# Patient Record
Sex: MALE | Race: WHITE | NOT HISPANIC OR LATINO | Employment: FULL TIME | URBAN - METROPOLITAN AREA
[De-identification: names, ages, dates, MRNs, and addresses within clinical notes are randomized per-mention and may not be internally consistent; named-entity substitution may affect disease eponyms.]

---

## 2017-02-27 ENCOUNTER — GENERIC CONVERSION - ENCOUNTER (OUTPATIENT)
Dept: OTHER | Facility: OTHER | Age: 37
End: 2017-02-27

## 2017-09-20 ENCOUNTER — OFFICE VISIT (OUTPATIENT)
Dept: LAB | Age: 37
End: 2017-09-20
Payer: COMMERCIAL

## 2017-09-20 ENCOUNTER — APPOINTMENT (OUTPATIENT)
Dept: RADIOLOGY | Age: 37
End: 2017-09-20
Payer: COMMERCIAL

## 2017-09-20 ENCOUNTER — TRANSCRIBE ORDERS (OUTPATIENT)
Dept: ADMINISTRATIVE | Age: 37
End: 2017-09-20

## 2017-09-20 ENCOUNTER — APPOINTMENT (OUTPATIENT)
Dept: LAB | Age: 37
End: 2017-09-20
Payer: COMMERCIAL

## 2017-09-20 DIAGNOSIS — R10.9 ABDOMINAL PAIN, UNSPECIFIED SITE: ICD-10-CM

## 2017-09-20 DIAGNOSIS — R10.9 ABDOMINAL PAIN, UNSPECIFIED SITE: Primary | ICD-10-CM

## 2017-09-20 LAB
ALBUMIN SERPL BCP-MCNC: 3.8 G/DL (ref 3.5–5)
ALP SERPL-CCNC: 61 U/L (ref 46–116)
ALT SERPL W P-5'-P-CCNC: 31 U/L (ref 12–78)
ANION GAP SERPL CALCULATED.3IONS-SCNC: 8 MMOL/L (ref 4–13)
AST SERPL W P-5'-P-CCNC: 26 U/L (ref 5–45)
ATRIAL RATE: 73 BPM
BASOPHILS # BLD AUTO: 0.02 THOUSANDS/ΜL (ref 0–0.1)
BASOPHILS NFR BLD AUTO: 0 % (ref 0–1)
BILIRUB SERPL-MCNC: 0.62 MG/DL (ref 0.2–1)
BUN SERPL-MCNC: 16 MG/DL (ref 5–25)
CALCIUM SERPL-MCNC: 9.1 MG/DL (ref 8.3–10.1)
CHLORIDE SERPL-SCNC: 100 MMOL/L (ref 100–108)
CO2 SERPL-SCNC: 29 MMOL/L (ref 21–32)
CREAT SERPL-MCNC: 1.1 MG/DL (ref 0.6–1.3)
EOSINOPHIL # BLD AUTO: 0.22 THOUSAND/ΜL (ref 0–0.61)
EOSINOPHIL NFR BLD AUTO: 4 % (ref 0–6)
ERYTHROCYTE [DISTWIDTH] IN BLOOD BY AUTOMATED COUNT: 13.6 % (ref 11.6–15.1)
GFR SERPL CREATININE-BSD FRML MDRD: 87 ML/MIN/1.73SQ M
GLUCOSE SERPL-MCNC: 101 MG/DL (ref 65–140)
HCT VFR BLD AUTO: 44.5 % (ref 36.5–49.3)
HGB BLD-MCNC: 14.6 G/DL (ref 12–17)
LYMPHOCYTES # BLD AUTO: 2.07 THOUSANDS/ΜL (ref 0.6–4.47)
LYMPHOCYTES NFR BLD AUTO: 34 % (ref 14–44)
MCH RBC QN AUTO: 29.1 PG (ref 26.8–34.3)
MCHC RBC AUTO-ENTMCNC: 32.8 G/DL (ref 31.4–37.4)
MCV RBC AUTO: 89 FL (ref 82–98)
MONOCYTES # BLD AUTO: 0.78 THOUSAND/ΜL (ref 0.17–1.22)
MONOCYTES NFR BLD AUTO: 13 % (ref 4–12)
NEUTROPHILS # BLD AUTO: 2.98 THOUSANDS/ΜL (ref 1.85–7.62)
NEUTS SEG NFR BLD AUTO: 49 % (ref 43–75)
NRBC BLD AUTO-RTO: 0 /100 WBCS
P AXIS: 53 DEGREES
PLATELET # BLD AUTO: 157 THOUSANDS/UL (ref 149–390)
PMV BLD AUTO: 11.5 FL (ref 8.9–12.7)
POTASSIUM SERPL-SCNC: 3.7 MMOL/L (ref 3.5–5.3)
PR INTERVAL: 156 MS
PROT SERPL-MCNC: 7.8 G/DL (ref 6.4–8.2)
QRS AXIS: 88 DEGREES
QRSD INTERVAL: 96 MS
QT INTERVAL: 388 MS
QTC INTERVAL: 427 MS
RBC # BLD AUTO: 5.01 MILLION/UL (ref 3.88–5.62)
SODIUM SERPL-SCNC: 137 MMOL/L (ref 136–145)
T WAVE AXIS: 48 DEGREES
VENTRICULAR RATE: 73 BPM
WBC # BLD AUTO: 6.09 THOUSAND/UL (ref 4.31–10.16)

## 2017-09-20 PROCEDURE — 71020 HB CHEST X-RAY 2VW FRONTAL&LATL: CPT

## 2017-09-20 PROCEDURE — 80053 COMPREHEN METABOLIC PANEL: CPT

## 2017-09-20 PROCEDURE — 36415 COLL VENOUS BLD VENIPUNCTURE: CPT

## 2017-09-20 PROCEDURE — 85025 COMPLETE CBC W/AUTO DIFF WBC: CPT

## 2017-09-20 PROCEDURE — 93005 ELECTROCARDIOGRAM TRACING: CPT

## 2020-09-02 PROCEDURE — 88305 TISSUE EXAM BY PATHOLOGIST: CPT | Performed by: PATHOLOGY

## 2020-09-03 ENCOUNTER — LAB REQUISITION (OUTPATIENT)
Dept: LAB | Facility: HOSPITAL | Age: 40
End: 2020-09-03
Payer: COMMERCIAL

## 2020-09-03 DIAGNOSIS — D48.5 NEOPLASM OF UNCERTAIN BEHAVIOR OF SKIN: ICD-10-CM

## 2021-10-11 ENCOUNTER — HOSPITAL ENCOUNTER (EMERGENCY)
Facility: HOSPITAL | Age: 41
Discharge: HOME | End: 2021-10-12
Attending: EMERGENCY MEDICINE
Payer: COMMERCIAL

## 2021-10-11 VITALS
OXYGEN SATURATION: 97 % | SYSTOLIC BLOOD PRESSURE: 117 MMHG | BODY MASS INDEX: 27.32 KG/M2 | HEART RATE: 78 BPM | RESPIRATION RATE: 18 BRPM | WEIGHT: 185 LBS | TEMPERATURE: 98.7 F | DIASTOLIC BLOOD PRESSURE: 80 MMHG

## 2021-10-11 DIAGNOSIS — S01.112A LEFT EYELID LACERATION, INITIAL ENCOUNTER: Primary | ICD-10-CM

## 2021-10-11 PROCEDURE — 99282 EMERGENCY DEPT VISIT SF MDM: CPT

## 2021-10-11 PROCEDURE — 99282 EMERGENCY DEPT VISIT SF MDM: CPT | Performed by: EMERGENCY MEDICINE

## 2021-10-11 PROCEDURE — 12011 RPR F/E/E/N/L/M 2.5 CM/<: CPT | Performed by: EMERGENCY MEDICINE

## 2021-10-11 RX ORDER — LIDOCAINE HYDROCHLORIDE 10 MG/ML
5 INJECTION, SOLUTION EPIDURAL; INFILTRATION; INTRACAUDAL; PERINEURAL ONCE
Status: COMPLETED | OUTPATIENT
Start: 2021-10-12 | End: 2021-10-11

## 2021-10-11 RX ADMIN — LIDOCAINE HYDROCHLORIDE 5 ML: 10 INJECTION, SOLUTION EPIDURAL; INFILTRATION; INTRACAUDAL; PERINEURAL at 23:51

## 2023-03-10 ENCOUNTER — EVALUATION (OUTPATIENT)
Dept: PHYSICAL THERAPY | Facility: OTHER | Age: 43
End: 2023-03-10

## 2023-03-10 DIAGNOSIS — Z98.890 STATUS POST MEDIAL MENISCECTOMY OF LEFT KNEE: Primary | ICD-10-CM

## 2023-03-10 DIAGNOSIS — M25.561 ACUTE PAIN OF RIGHT KNEE: ICD-10-CM

## 2023-03-10 NOTE — PROGRESS NOTES
PT Evaluation     Today's date: 3/10/2023  Patient name: Paige Diallo  : 1980  MRN: [id number]  Referring provider: Kalen Suggs PA-C  Dx:   Encounter Diagnosis     ICD-10-CM    1  Acute pain of right knee  M25 561       2  Status post medial meniscectomy of left knee  Z98 890                      Assessment  Assessment details: Paige Diallo is a 38y o  year-old M who presents with s/p L PMM and R medial knee pain  DOS was 23  Pain developed in L knee approximately four years prior  Pt underwent L plica surgery in 2021  Pt underwent previous physical therapy tx for the L knee s/p in  with a positive experience, however still felt discomfort and pain in the L knee in which follow up with ortho declared for PMM  A couple days prior to scheduled PMM, pt went hiking and twisted the R knee in which doctor remarked potential R sprained MCL  Pt is utilizing elastic knee sleeve on R knee  Pt regards L knee feeling "astronomically better" since PMM  Pt is concerned about potential baker's cyst presence and tightness in back of L knee  Pt has no previous hx of LBP or B knee issues besides what has been described  No issues with sleeping  Pt remarks surgeon telling him he is WBAT with activity restrictions  No further referral is necessary at this time  Pt is fairly active, regularly participating in [employer], hiking, snowboarding and employed as a local   Pt describes how eager he is to return to PLOF and return to work  Pt may be experiencing L popliteus tightness, B knee pain, and hip weakness based on clinical presentation of L PMM  Pt would benefit from skilled physical therapy services to address current deficits, improve quality of life, and restore PLOF      Primary Impairments:  1) Decreased L knee extension  2) B LE neural tension  3) B hip weakness  4) B knee pain  5) L popliteal tightness    Etiologic factors include previous L knee plica surgery, overuse of activity with chronic loading of B knees, reliance on R knee causing R knee pain due to L knee impairments, demanding physical job    Positive prognostic indicators include age, attitude, physically active  Negative prognostic factors include eagerness to overuse or load too much    Function Based Goals:  Patient will be independent with HEP upon discharge  Patient will be able to independently manage symptoms upon discharge  Patient will be able to participate in recreational activities with minimal to no symptoms upon discharge  Patient will be able to return to work with minimal to no symptoms upon discharge  Impairment Based Goals:  Pt will improve B hip strength by 1/2 MMT grade in 4 weeks  Pt will improve L knee ROM to 0 degrees extension in 4 weeks  Pt will improve B LE neural tension by (-) slump test in 4 weeks  Pt will improve soft tissue mobility of popliteus through IASTM and PT palpation in 3 weeks  Pt will improve hamstring flexibility by SLR measurements/PT observation in 4 weeks    Impairments: abnormal or restricted ROM, impaired physical strength and pain with function    Symptom irritability: lowUnderstanding of Dx/Px/POC: good   Prognosis: good    Plan  Plan details: 2x/week for 12 weeks with HEP  Referral necessary: No  Planned modality interventions: low level laser therapy, manual electrical stimulation, TENS, cryotherapy and electrical stimulation/Russian stimulation  Other planned modality interventions: EPAT  Planned therapy interventions: joint mobilization, manual therapy, massage, balance, ADL training, motor coordination training, neuromuscular re-education, body mechanics training, patient education, postural training, coordination, stretching, strengthening, therapeutic activities, therapeutic exercise, flexibility, functional ROM exercises, gait training and home exercise program  Frequency: 2x week  Duration in weeks: 12  Treatment plan discussed with: patient        Subjective Evaluation    History of Present Illness  Date of onset: 3/10/2019  Date of surgery: 2023  Mechanism of injury: surgery          Recurrent probem    Quality of life: good    Pain  Current pain ratin  At best pain ratin  At worst pain ratin  Location: L knee, R medial side  (deep squat, pinch )  Quality: dull ache (popping but feels good)  Relieving factors: ice and relaxation  Exacerbated by: duck wall, deep squat,   Progression: improved    Social Support  Lives with: parents (my parents )    Employment status: not working ( )  Exercise comments: fairly active       Diagnostic Tests  MRI studies: abnormal (Only received for L knee; tear of medial meniscus )  Treatments  Previous treatment: physical therapy [facility] )  Patient Goals  Patient goals for therapy: decreased edema, increased strength, decreased pain, independence with ADLs/IADLs, improved balance, increased motion, return to work and return to sport/leisure activities  Patient goal: Pt states wanting to return back to work, [employer] and recreational activities  Objective     Static Posture     Comments  Lower Extremity IE       LQS:  L4 reflex: L     2+  R 2+  S1 reflex: L   2+    R 2+  Dermatomes: L   2+    R 2+  Myotomes: L    2+   R 2+    Slump test: L   +    R +      Palpation: popliteus tightness in B     Squat: good form with B ankle pronation     Knee MMT:  Flexion: L  4+ /5    R    4+/5  Extension: L  4+ /5    R    4+/5      Foot joint mobility:  Subtalar mobility: L   Mod bone    R mod bone  Midfoot mobility: L    good   R good  Forefoot mobility: L    good   R good  Talocrural mobility: L   Mod bone    R mod bone       Knee ROM:  Flexion: L    122 p! R -2 p! Extension: L    122 p!    R 0  Patellar mobility: L     good  R good     Quad contraction: L  good     R good     Hip MMT:  Flexion: L   4+ /5     R   4+ /5  Abduction: L    4+/5     R    4/5  Extension glute: L   4+ /5     R 4+/5  Extension hamstring: L   4 /5, p!      R   4+ /5    Ely's test: L +       R +    ASLR: L 100 R 95  PSLR: L 90 R 90    Knee clinical tests:  Anterior drawer: L   Not tested    R -  Posterior drawer: L   Not tested     R -  Valgus: L       R - (in extension)  Varus: L       R - (in extension)  Tiffany's: L       R -   Thessaly's: L      R + in flexion, - in extension               Precautions: WBAT    Treatment performed and note completed by Bere Méndez, SPT      Manuals 3/10            Prone knee ext             L knee distraction             Supine knee extension             Popliteus STM             Neuro Re-Ed             Sciatic nerve glides  2x8 each            Hamstring stretch 3x30" each            TKE             Soleus squats             Leg press             clamshells             Prone hamstrings ext 2x10x5"            HR/TR                          Prone glute ext 2x10x5"            Ther Ex             bike             Mini squats             LAQ                                                                              Ther Activity                                       Gait Training                                       Modalities

## 2023-03-10 NOTE — LETTER
March 10, 2023    Ronaldo Quezada PA-C  [address]    Patient: Manuel Arango   YOB: 1980   Date of Visit: 3/10/2023     Encounter Diagnosis     ICD-10-CM    1  Status post medial meniscectomy of left knee  Z98 890       2  Acute pain of right knee  M25 561           Dear Dr Az Portillo: Thank you for your recent referral of Manuel Arango  Please review the attached evaluation summary from Flash's recent visit  Please verify that you agree with the plan of care by signing the attached order  If you have any questions or concerns, please do not hesitate to call  I sincerely appreciate the opportunity to share in the care of one of your patients and hope to have another opportunity to work with you in the near future  Sincerely,    Vidhya Campbell, PT      Referring Provider:      I certify that I have read the below Plan of Care and certify the need for these services furnished under this plan of treatment while under my care  Ronaldo Quezada PA-C  [address]          PT Evaluation     Today's date: 3/10/2023  Patient name: Manuel Arango  : 1980  MRN: [id number]  Referring provider: José Luis John PA-C  Dx:   Encounter Diagnosis     ICD-10-CM    1  Acute pain of right knee  M25 561       2  Status post medial meniscectomy of left knee  Z98 890                      Assessment  Assessment details: Manuel Arango is a 38y o  year-old M who presents with s/p L PMM and R medial knee pain  DOS was 23  Pain developed in L knee approximately four years prior  Pt underwent L plica surgery in 2021  Pt underwent previous physical therapy tx for the L knee s/p in  with a positive experience, however still felt discomfort and pain in the L knee in which follow up with ortho declared for PMM   A couple days prior to scheduled PMM, pt went hiking and twisted the R knee in which doctor remarked potential R sprained MCL  Pt is utilizing elastic knee sleeve on R knee  Pt regards L knee feeling "astronomically better" since PMM  Pt is concerned about potential baker's cyst presence and tightness in back of L knee  Pt has no previous hx of LBP or B knee issues besides what has been described  No issues with sleeping  Pt remarks surgeon telling him he is WBAT with activity restrictions  No further referral is necessary at this time  Pt is fairly active, regularly participating in [employer], [employer], [employer] and employed as a local   Pt describes how eager he is to return to PLOF and return to work  Pt may be experiencing L popliteus tightness, B knee pain, and hip weakness based on clinical presentation of L PMM  Pt would benefit from skilled physical therapy services to address current deficits, improve quality of life, and restore PLOF  Primary Impairments:  1) Decreased L knee extension  2) B LE neural tension  3) B hip weakness  4) B knee pain  5) L popliteal tightness    Etiologic factors include previous L knee plica surgery, overuse of activity with chronic loading of B knees, reliance on R knee causing R knee pain due to L knee impairments, demanding physical job    Positive prognostic indicators include age, attitude, physically active  Negative prognostic factors include eagerness to overuse or load too much    Function Based Goals:  Patient will be independent with HEP upon discharge  Patient will be able to independently manage symptoms upon discharge  Patient will be able to participate in recreational activities with minimal to no symptoms upon discharge  Patient will be able to return to work with minimal to no symptoms upon discharge  Impairment Based Goals:  Pt will improve B hip strength by 1/2 MMT grade in 4 weeks  Pt will improve L knee ROM to 0 degrees extension in 4 weeks    Pt will improve B LE neural tension by (-) slump test in 4 weeks  Pt will improve soft tissue mobility of popliteus through IASTM and PT palpation in 3 weeks  Pt will improve hamstring flexibility by SLR measurements/PT observation in 4 weeks  Impairments: abnormal or restricted ROM, impaired physical strength and pain with function    Symptom irritability: lowUnderstanding of Dx/Px/POC: good   Prognosis: good    Plan  Plan details: 2x/week for 12 weeks with HEP  Referral necessary: No  Planned modality interventions: low level laser therapy, manual electrical stimulation, TENS, cryotherapy and electrical stimulation/Russian stimulation  Other planned modality interventions: EPAT  Planned therapy interventions: joint mobilization, manual therapy, massage, balance, ADL training, motor coordination training, neuromuscular re-education, body mechanics training, patient education, postural training, coordination, stretching, strengthening, therapeutic activities, therapeutic exercise, flexibility, functional ROM exercises, gait training and home exercise program  Frequency: 2x week  Duration in weeks: 12  Treatment plan discussed with: patient        Subjective Evaluation    History of Present Illness  Date of onset: 3/10/2019  Date of surgery: 2023  Mechanism of injury: surgery          Recurrent probem    Quality of life: good    Pain  Current pain ratin  At best pain ratin  At worst pain ratin  Location: L knee, R medial side  (deep squat, pinch )  Quality: dull ache (popping but feels good)  Relieving factors: ice and relaxation  Exacerbated by: duck wall, deep squat,   Progression: improved    Social Support  Lives with: parents (my parents )    Employment status: not working ( )  Exercise comments: fairly active       Diagnostic Tests  MRI studies: abnormal (Only received for L knee; tear of medial meniscus )  Treatments  Previous treatment: physical therapy [facility] [place] [facility] )  Patient Goals  Patient goals for therapy: decreased edema, increased strength, decreased pain, independence with ADLs/IADLs, improved balance, increased motion, return to work and return to sport/leisure activities  Patient goal: Pt states wanting to return back to work, jiu jitsu and recreational activities  Objective     Static Posture     Comments  Lower Extremity IE       LQS:  L4 reflex: L     2+  R 2+  S1 reflex: L   2+    R 2+  Dermatomes: L   2+    R 2+  Myotomes: L    2+   R 2+    Slump test: L   +    R +      Palpation: popliteus tightness in B     Squat: good form with B ankle pronation     Knee MMT:  Flexion: L  4+ /5    R    4+/5  Extension: L  4+ /5    R    4+/5      Foot joint mobility:  Subtalar mobility: L   Mod bone    R mod bone  Midfoot mobility: L    good   R good  Forefoot mobility: L    good   R good  Talocrural mobility: L   Mod bone    R mod bone       Knee ROM:  Flexion: L    122 p! R -2 p! Extension: L    122 p! R 0  Patellar mobility: L     good  R good     Quad contraction: L  good     R good     Hip MMT:  Flexion: L   4+ /5     R   4+ /5  Abduction: L    4+/5     R    4/5  Extension glute: L   4+ /5     R    4+/5  Extension hamstring: L   4 /5, p!      R   4+ /5    Ely's test: L +       R +    ASLR: L 100 R 95  PSLR: L 90 R 90    Knee clinical tests:  Anterior drawer: L   Not tested    R -  Posterior drawer: L   Not tested     R -  Valgus: L       R - (in extension)  Varus: L       R - (in extension)  Tiffany's: L       R -   Thessaly's: L      R + in flexion, - in extension              Precautions: WBAT    Treatment performed and note completed by [facility]      Manuals 3/10            Prone knee ext             L knee distraction             Supine knee extension             Popliteus STM             Neuro Re-Ed             Sciatic nerve glides  2x8 each            Hamstring stretch 3x30" each            TKE             Soleus squats             Leg press             clamshells             Prone hamstrings ext 2x10x5"            HR/TR                          Prone glute ext 2x10x5"            Ther Ex             bike             Mini squats             LAQ                                                                              Ther Activity                                       Gait Training                                       Modalities

## 2023-03-13 ENCOUNTER — OFFICE VISIT (OUTPATIENT)
Dept: PHYSICAL THERAPY | Facility: OTHER | Age: 43
End: 2023-03-13

## 2023-03-13 DIAGNOSIS — M25.561 ACUTE PAIN OF RIGHT KNEE: Primary | ICD-10-CM

## 2023-03-13 DIAGNOSIS — Z98.890 STATUS POST MEDIAL MENISCECTOMY OF LEFT KNEE: ICD-10-CM

## 2023-03-13 NOTE — PROGRESS NOTES
Daily Note     Today's date: 3/13/2023  Patient name: Anurag Levy  : 1980  MRN: [id number]  Referring provider: Liyah Ureña PA-C  Dx:   Encounter Diagnosis     ICD-10-CM    1  Acute pain of right knee  M25 561       2  Status post medial meniscectomy of left knee  Z98 890               Total Treatment time: ; one on one     Subjective: Pt remarks feeling good and nervous/hesitant about R knee  "Each day gets better "      Objective: See treatment diary below      Assessment: Assessed Hip ER/IR MMT (B )  Introduced LAQ, TKE, Leg press,  and [place] Hamstring Curls in which pt tolerated appropriately with moderate fatigue  Pt will continue to participate in B LE strengthening in order to return to PLOF  No pain experienced throughout session  Plan: Continue per plan of care        Precautions: WBAT    Treatment performed and note completed by Tim Hinkle, SPT      Manuals 3/10 3/13           Prone knee ext             L knee distraction  AC           Supine knee extension  AC           Popliteus STM  AC L knee           Neuro Re-Ed             Sciatic nerve glides  2x8 each 1x10 each           Hamstring stretch 3x30" each            TKE  15 5 Phelan   2x10x5" B           Soleus squats             Leg press  2x10 110# DL           clamshells             Prone hamstrings ext 2x10x5"            HR/TR             Filipino hamstrings  2x10  pball             Kneeling Weight shift+ glute engagement              Eccentric Ankle DF             Prone glute ext 2x10x5"            Ther Ex             bike  10'           Mini squats             LAQ  2x10x5" 4#                                                                            Ther Activity                                       Gait Training                                       Modalities

## 2023-03-17 ENCOUNTER — OFFICE VISIT (OUTPATIENT)
Dept: PHYSICAL THERAPY | Facility: OTHER | Age: 43
End: 2023-03-17

## 2023-03-17 DIAGNOSIS — M25.561 ACUTE PAIN OF RIGHT KNEE: Primary | ICD-10-CM

## 2023-03-17 DIAGNOSIS — Z98.890 STATUS POST MEDIAL MENISCECTOMY OF LEFT KNEE: ICD-10-CM

## 2023-03-17 NOTE — PROGRESS NOTES
Daily Note     Today's date: 3/17/2023  Patient name: Keaton Query  : 1980  MRN: [id number]  Referring provider: Alvarez Ferguson PA-C  Dx:   Encounter Diagnosis     ICD-10-CM    1  Acute pain of right knee  M25 561       2  Status post medial meniscectomy of left knee  Z98 890           Start Time: 1115Total treatment time: 0977-7748; one on one time 2880-9505  Stop Time: 1200  Total time in clinic (min): 45 minutes    Subjective: Pt describes no popliteus tightness in the L knee and overdoing it with HEP by adding "holds"  Pt describes no change in pain with R knee  Objective: See treatment diary below      Assessment: Pt educated upon BFR, HEP modification, and imaging  Pt tolerated B LE strengthening/mobility appropriately with moderate fatigue  BFR nv  Introduced step downs and hip mobility with blanca  Pt will continue to participate in skilled PT to address B LE weakness and to improve return to recreational activities  Plan: Continue per plan of care        Precautions: WBAT    Treatment performed and note completed by Varsha Nielsen, SPT      Manuals 3/10 3/13 3/17          Prone knee ext             L knee distraction  AC           Supine knee extension  AC           Popliteus STM  AC L knee AC L knee          Neuro Re-Ed             Sciatic nerve glides  2x8 each 1x10 each 1x10 each          Hamstring stretch 3x30" each            TKE  15 5 Phelan   2x10x5" B           Soleus squats   10"x10          Leg press  2x10 110# DL           clamshells             Prone hamstrings ext 2x10x5"            TR   10"x20  scrunched toes             Bangladeshi hamstrings  2x10  pball             Sassy hips   nv          Step down    6" 1x10 ea lateral/front          Prone quad stretch    1x30" ea with strap          Prone glute ext 2x10x5"            Ther Ex             bike  10' 10'           Mini squats             LAQ  2x10x5" 4# Ther Activity             Pt education   BFR  HEP overuse                         Gait Training                                       Modalities

## 2023-03-20 ENCOUNTER — OFFICE VISIT (OUTPATIENT)
Dept: PHYSICAL THERAPY | Facility: OTHER | Age: 43
End: 2023-03-20

## 2023-03-20 DIAGNOSIS — Z98.890 STATUS POST MEDIAL MENISCECTOMY OF LEFT KNEE: ICD-10-CM

## 2023-03-20 DIAGNOSIS — M25.561 ACUTE PAIN OF RIGHT KNEE: Primary | ICD-10-CM

## 2023-03-20 NOTE — PROGRESS NOTES
Daily Note     Today's date: 3/20/2023  Patient name: Nathalie Coleman  : 1980  MRN: [id number]  Referring provider: Nile Mullen PA-C  Dx:   Encounter Diagnosis     ICD-10-CM    1  Acute pain of right knee  M25 561       2  Status post medial meniscectomy of left knee  Z98 890                      Subjective: Pt remarks feeling so much better with resting and not overdoing exercise over the weekend  Objective: See treatment diary below       Assessment: Introduced BFR this session with SLR, prone hamstring extension and SL abduction  Pt tolerated treatment with moderate fatigue  Pt educated on post treatment protein consumption  Will incorporate BFR in leg press and SL RDLs nv  Pt will continue to participate in skilled therapy in order to increase B LE strength in order to return to recreational activities  Plan: Continue per plan of care  Precautions: WBAT    Treatment performed and note completed by Lisbeth Murphy, SPT      Manuals 3/10 3/13 3/17 3/20         Prone knee ext             L knee distraction  AC           Supine knee extension  AC           Popliteus STM  AC L knee AC L knee          Neuro Re-Ed             Sciatic nerve glides  2x8 each 1x10 each 1x10 each          Hamstring stretch 3x30" each            SLR    BFR L knee  30x, 3x15         SL Abd       BFR  L knee  30x, 3x15         TKE  15 5 Phelan   2x10x5" B           Soleus squats   10"x10          Leg press  2x10 110# DL           clamshells             Prone hamstrings ext 2x10x5"   BFR  L knee  30x, 3x15         TR   10"x20  scrunched toes             [place] hamstrings  2x10  pball             Sassy hips   nv          Step down    6" 1x10 ea lateral/front          Prone quad stretch    1x30" ea with strap          Prone glute ext 2x10x5"            Ther Ex             bike  10' 10'           Mini squats             LAQ  2x10x5" 4#                                                                            Ther Activity Pt education   BFR  HEP overuse                         Gait Training                                       Modalities

## 2023-03-24 ENCOUNTER — OFFICE VISIT (OUTPATIENT)
Dept: PHYSICAL THERAPY | Facility: OTHER | Age: 43
End: 2023-03-24

## 2023-03-24 DIAGNOSIS — Z98.890 STATUS POST MEDIAL MENISCECTOMY OF LEFT KNEE: ICD-10-CM

## 2023-03-24 DIAGNOSIS — M25.561 ACUTE PAIN OF RIGHT KNEE: Primary | ICD-10-CM

## 2023-03-24 NOTE — PROGRESS NOTES
Daily Note     Today's date: 3/24/2023  Patient name: Michelet Suggs  : 1980  MRN: [id number]  Referring provider: Tracey Camp PA-C  Dx:   Encounter Diagnosis     ICD-10-CM    1  Acute pain of right knee  M25 561       2  Status post medial meniscectomy of left knee  Z98 890                      Subjective: "I felt some soreness last time but honestly really good  Probably overdoing it a bit "      Objective: See treatment diary below      Assessment: Educated pt on the importance of avoiding overuse and rest for proper healing  Introduced sassy hip plus step down  and SL RDL in which pt tolerated with moderate fatigue requiring multiple VC on proper posture  Nv FOTO, just sassy hip and more education on dowel cueing  Plan: Continue per plan of care  Precautions: WBAT    Treatment performed and note completed by Vivian Colon, SPT      Manuals 3/10 3/13 3/17 3/20 3/24        Prone knee ext             L knee distraction  AC           Supine knee extension  AC           Popliteus STM  AC L knee AC L knee          Neuro Re-Ed             Sciatic nerve glides  2x8 each 1x10 each 1x10 each          Hamstring stretch 3x30" each            SLR    BFR L knee  30x, 3x15 BFR L knee  30x, 3x15        SL Abd       BFR  L knee  30x, 3x15         TKE  15 5 Phelan   2x10x5" B   15 5 Phelan   2x10x5" B        Soleus squats   10"x10          Leg press  2x10 110# DL           clamshells     nv        Prone hamstrings ext 2x10x5"   BFR  L knee  30x, 3x15 BFR  L knee  30x, 3x15        SL Balance     3x30" airex B        TR   10"x20  scrunched toes             [place]uv[place] hamstrings  2x10  pball             Sassy hips+ step down   nv  1x10 B  6"        SL RDL     1x8 B with dowel         Step down    6" 1x10 ea lateral/front          Prone quad stretch    1x30" ea with strap          Prone glute ext 2x10x5"            Ther Ex             bike  10' 10'   10'        Mini squats             LAQ  2x10x5" 4# Ther Activity             Pt education   BFR  HEP overuse                         Gait Training                                       Modalities

## 2023-03-28 ENCOUNTER — OFFICE VISIT (OUTPATIENT)
Dept: PHYSICAL THERAPY | Facility: OTHER | Age: 43
End: 2023-03-28

## 2023-03-28 DIAGNOSIS — Z98.890 STATUS POST MEDIAL MENISCECTOMY OF LEFT KNEE: Primary | ICD-10-CM

## 2023-03-28 DIAGNOSIS — M25.561 ACUTE PAIN OF RIGHT KNEE: ICD-10-CM

## 2023-03-31 ENCOUNTER — OFFICE VISIT (OUTPATIENT)
Dept: PHYSICAL THERAPY | Facility: OTHER | Age: 43
End: 2023-03-31

## 2023-03-31 DIAGNOSIS — M25.561 ACUTE PAIN OF RIGHT KNEE: ICD-10-CM

## 2023-03-31 DIAGNOSIS — Z98.890 STATUS POST MEDIAL MENISCECTOMY OF LEFT KNEE: Primary | ICD-10-CM

## 2023-03-31 NOTE — PROGRESS NOTES
"Daily Note     Today's date: 3/31/2023  Patient name: Astrid Alejandro  : 1980  MRN: [id number]  Referring provider: Lisa Kim PA-C  Dx:   Encounter Diagnosis     ICD-10-CM    1  Status post medial meniscectomy of left knee  Z98 890       2  Acute pain of right knee  M25 561            Total treatment time: ; one on one           Subjective: Pt remarks no soreness and relief in R knee  Pt describes only feeling pain in deep \"L knee flexion\"       Objective: See treatment diary below      Assessment: Measured PROM L knee flexion at 136 degrees  Introduced lunges, piriformis stretch and hip mobility to improve functional jiu jitsu tolerance  Pt will continue to participate in skilled PT in order to improve B hip strengthening in order to return to recreational jiu jitsu  Nv will reassess B hip strength and assess child's pose in order to promote increased B knee flexion that mimics sport  Plan: Continue per plan of care  Precautions: WBAT    Treatment performed and note completed by Katina Acuña, CARRILLO      Manuals 3/10 3/13 3/17 3/20 3/24 3/27 3/31      Prone knee ext             L knee distraction  AC           Supine knee extension  AC           L knee ROM             Piriformis stretch        B AC      Popliteus STM  AC L knee AC L knee          Neuro Re-Ed             Sciatic nerve glides  2x8 each 1x10 each 1x10 each          Hamstring stretch 3x30\" each            SLR    BFR L knee  30x, 3x15 BFR L knee  30x, 3x15 BFR L knee  30x, 3x15       SL Abd       BFR  L knee  30x, 3x15  BFR  L knee  30x, 3x15       TKE  15 5 Phelan   2x10x5\" B   15 5 Phelan   2x10x5\" B        Soleus squats   10\"x10          Leg press  2x10 110# DL           clamshells     nv        Prone hamstrings ext 2x10x5\"   BFR  L knee  30x, 3x15 BFR  L knee  30x, 3x15  BFR L knee  30x, 3x15      Sassy hip       15x B 10x2 B      SL Balance     3x30\" airex B        TR   10\"x20  scrunched toes             [place] " "hamstrings  2x10  pball             Sassy hips+ step down   nv  1x10 B  6\"        SL RDL     1x8 B with dowel  1x8 B with dowel       Step down    6\" 1x10 ea lateral/front          Prone quad stretch    1x30\" ea with strap          Lunges        10x10\" B  BTB      Prone glute ext 2x10x5\"      BFR L knee  30x, 3x15      Ther Ex             bike  10' 10'   10' 10' TM 10'      Mini squats             LAQ  2x10x5\" 4#    2x10x5\" 4#  B                                                                        Ther Activity             Pt education   BFR  HEP overuse            Hip Mobility-Jiu Jitsu       5x      Gait Training                                       Modalities                                            "

## 2023-04-05 ENCOUNTER — OFFICE VISIT (OUTPATIENT)
Dept: PHYSICAL THERAPY | Facility: OTHER | Age: 43
End: 2023-04-05

## 2023-04-05 DIAGNOSIS — M25.561 ACUTE PAIN OF RIGHT KNEE: ICD-10-CM

## 2023-04-05 DIAGNOSIS — Z98.890 STATUS POST MEDIAL MENISCECTOMY OF LEFT KNEE: Primary | ICD-10-CM

## 2023-04-05 NOTE — PROGRESS NOTES
"Daily Note     Today's date: 2023  Patient name: Sabina Guevara  : 1980  MRN: [id number]  Referring provider: Radha Pillai PA-C  Dx:   Encounter Diagnosis     ICD-10-CM    1  Status post medial meniscectomy of left knee  Z98 890       2  Acute pain of right knee  M25 561                      Subjective: \"I feel tightness in my popliteus from going into deep flexion today  \"      Objective: See treatment diary below    Hip MMT:  Hip Flexion: L 4+/5 R 4+/5  Hip extension hamstring L 4+/5 R 4+/5    glute L 5/5 R 5/5  Hip Abduction L 4+/5 R 4+/5        Assessment: Retested MMT of B LE with improved strength of B glutes  Session focused on reducing posterior knee tightness due to decreased L soft tissue mobility of the knee  Pt tolerated treatment without difficulty  Educated pt on L active quad engagement in everyday activities to avoid L hyperextension  Pt sees ortho on  to discuss return to work  Nv will incorporate kneeling exercises to prepare pt for recreational activities  Plan: Continue per plan of care  Precautions: WBAT    Treatment performed and note completed by Luiza Farah, SPT      Manuals 3/10 3/13 3/17 3/20 3/24 3/27 3/31 4/5     Prone knee ext        AC  2' static with cupping  30x AROM PF/DF     L knee distraction  AC      AC     Supine knee extension  AC           L knee ROM             Piriformis stretch        B AC      Popliteus STM  AC L knee AC L knee     AC     Neuro Re-Ed             Sciatic nerve glides  2x8 each 1x10 each 1x10 each          Hamstring stretch 3x30\" each            SLR    BFR L knee  30x, 3x15 BFR L knee  30x, 3x15 BFR L knee  30x, 3x15       SL Abd       BFR  L knee  30x, 3x15  BFR  L knee  30x, 3x15       TKE  15 5 Phelan   2x10x5\" B   15 5 Bere Meier   2x10x5\" B   15 5 Phelan  30x5\"       Slant Board        Gastroc/Soleus 10x10\" ea     Soleus squats   10\"x10          Leg press  2x10 110# DL      75#  2-1  Sl ecc  10x10\"     johnathon     nv      " "  Prone hamstrings ext 2x10x5\"   BFR  L knee  30x, 3x15 BFR  L knee  30x, 3x15  BFR L knee  30x, 3x15      Sassy hip       15x B 10x2 B      SL Balance     3x30\" airex B        TR   10\"x20  scrunched toes             [place] hamstrings  2x10  pball             Sassy hips+ step down   nv  1x10 B  6\"        SL RDL     1x8 B with dowel  1x8 B with dowel       Step down    6\" 1x10 ea lateral/front          Prone quad stretch    1x30\" ea with strap          Lunges        10x10\" B  BTB      Prone glute ext 2x10x5\"      BFR L knee  30x, 3x15      Ther Ex             bike  10' 10'   10' 10' TM 10' 10'     Mini squats             LAQ  2x10x5\" 4#    2x10x5\" 4#  B                                                                        Ther Activity             Pt education   BFR  HEP overuse       Avoiding hyperextension     Hip Mobility-Jiu Jitsu       5x      Gait Training                                       Modalities                                            "

## 2023-04-25 ENCOUNTER — OFFICE VISIT (OUTPATIENT)
Dept: PHYSICAL THERAPY | Facility: OTHER | Age: 43
End: 2023-04-25

## 2023-04-25 DIAGNOSIS — Z98.890 STATUS POST MEDIAL MENISCECTOMY OF LEFT KNEE: Primary | ICD-10-CM

## 2023-04-25 DIAGNOSIS — M25.561 ACUTE PAIN OF RIGHT KNEE: ICD-10-CM

## 2023-04-25 NOTE — PROGRESS NOTES
"Daily Note     Today's date: 2023  Patient name: Sherlyn Keating  : 1980  MRN: [id number]  Referring provider: Yohana Tejeda PA-C  Dx:   Encounter Diagnosis     ICD-10-CM    1  Status post medial meniscectomy of left knee  Z98 890       2  Acute pain of right knee  M25 561                      Subjective: Pt remarks \"forgetting about my knee because it felt so good\" on vacation the previous week  Pt remarks being ready for discharge today  Objective: See treatment diary below      Assessment: Pt remarks 95% improvement since initial evaluation  Went over and demonstrated HEP  Pt has met functional and impairment based goals  Only remaining goals are returning to [facility] once PLOF L knee flexion is achieved  Plan: Pt discharged with HEP  Precautions: WBAT    Treatment performed and note completed by Aden Montenegro, SPT      Manuals 3/10 3/13 3/17 3/20 3/24 3/27 3/31 4/5 4/10 4/12 4/25   Prone knee ext        AC  2' static with cupping  30x AROM PF/DF      L knee distraction  AC      AC      Supine knee extension  AC            L knee ROM              Piriformis stretch        B AC       Hamstring STM          AC  Foam roller    Popliteus STM  AC L knee AC L knee     AC      Neuro Re-Ed              Sciatic nerve glides  2x8 each 1x10 each 1x10 each           Hamstring stretch 3x30\" each             Prone quad stretch          3x30\" with strap B    SLR    BFR L knee  30x, 3x15 BFR L knee  30x, 3x15 BFR L knee  30x, 3x15        SL Abd       BFR  L knee  30x, 3x15  BFR  L knee  30x, 3x15        TKE  15 5 Phelan   2x10x5\" B   15 5 Alvaro Quale   2x10x5\" B   15 5 Alvaro Quale  30x5\"   20 0  Phelan  30x5\"     Slant Board        Gastroc/Soleus 10x10\" ea      Soleus squats   10\"x10           Leg press  2x10 110# DL      75#  2-1  Sl ecc  10x10\" 75#  2-1  Sl ecc  10x2x5\" lower 80#  2-1   SL  ecc   10x2x5\"    clamshells     nv      10x2x5\" B   Prone hamstrings ext 2x10x5\"   BFR  L knee  30x, 3x15 BFR  L knee  30x, " Kikimarek Peralta  3x15  BFR L knee  30x, 3x15       Sassy hip       15x B 10x2 B       SL Balance     3x30\" airex B         TR   10\"x20  scrunched toes              [place] hamstrings  2x10  pball              Sassy hips+ step down   nv  1x10 B  6\"         SL RDL     1x8 B with dowel  1x8 B with dowel        Step down    6\" 1x10 ea lateral/front           Prone quad stretch    1x30\" ea with strap           Lunges        10x10\" B  BTB    Walking  10x2 ea   PAIL couch stretch         12\"  static hold 1'  AROM knee flexion  10\"x4  AROM   quad contraction  10\"x4   18\"  static hold 1'  AROM knee flexion  10\"x4  AROM   quad contraction  B    Standing hip flexion            GTB  10x1  B   Bird dog            LE only  8x2   Prone glute ext 2x10x5\"      BFR L knee  30x, 3x15       Ther Ex              bike  10' 10'   10' 10' TM 10' 10'  10' 10'   Mini squats              LAQ  2x10x5\" 4#    2x10x5\" 4#  B   4#  3x10x5\"     TM         Walk  10'     Kneel to Tabletop                                                        Ther Activity              Pt education   BFR  HEP overuse       Avoiding hyperextension      Agility Ladder crawling         Bearcrwal 2x Bearcrawl 2x    Reverse nordic           airex  5x10\"  Feet flat    jiu jitsu toe up  5x10\" 10x10\"   Kneel with UE PNF D1/D2           5# B  10x ea   Hip Mobility-Jiu Jitsu       5x       Gait Training                                          Modalities                                               " 

## 2023-08-09 ENCOUNTER — APPOINTMENT (OUTPATIENT)
Dept: RADIOLOGY | Age: 43
End: 2023-08-09
Payer: COMMERCIAL

## 2023-08-09 DIAGNOSIS — M79.672 LEFT FOOT PAIN: ICD-10-CM

## 2023-08-09 PROCEDURE — 73630 X-RAY EXAM OF FOOT: CPT

## 2023-09-11 ENCOUNTER — TELEPHONE (OUTPATIENT)
Dept: NEUROLOGY | Facility: CLINIC | Age: 43
End: 2023-09-11

## 2023-09-18 ENCOUNTER — PROCEDURE VISIT (OUTPATIENT)
Dept: NEUROLOGY | Facility: CLINIC | Age: 43
End: 2023-09-18
Payer: COMMERCIAL

## 2023-09-18 ENCOUNTER — TELEPHONE (OUTPATIENT)
Dept: NEUROLOGY | Facility: CLINIC | Age: 43
End: 2023-09-18

## 2023-09-18 DIAGNOSIS — R20.0 NUMBNESS AND TINGLING OF FOOT: ICD-10-CM

## 2023-09-18 DIAGNOSIS — R20.2 NUMBNESS AND TINGLING OF FOOT: ICD-10-CM

## 2023-09-18 PROCEDURE — 95909 NRV CNDJ TST 5-6 STUDIES: CPT | Performed by: PHYSICAL MEDICINE & REHABILITATION

## 2023-09-18 PROCEDURE — 95886 MUSC TEST DONE W/N TEST COMP: CPT | Performed by: PHYSICAL MEDICINE & REHABILITATION

## 2023-09-18 NOTE — PROGRESS NOTES
EMG 1 Limb     Date/Time 9/18/2023 8:15 AM     Performed by  Zuleyka Zeng MD   Authorized by Rosio Hussein DPM           EMG left lower extremity completed today.

## 2024-04-09 ENCOUNTER — EVALUATION (OUTPATIENT)
Dept: PHYSICAL THERAPY | Facility: OTHER | Age: 44
End: 2024-04-09
Payer: COMMERCIAL

## 2024-04-09 DIAGNOSIS — M54.50 ACUTE RIGHT-SIDED LOW BACK PAIN WITHOUT SCIATICA: Primary | ICD-10-CM

## 2024-04-09 PROCEDURE — 97530 THERAPEUTIC ACTIVITIES: CPT

## 2024-04-09 PROCEDURE — 97161 PT EVAL LOW COMPLEX 20 MIN: CPT

## 2024-04-09 PROCEDURE — 97112 NEUROMUSCULAR REEDUCATION: CPT

## 2024-04-09 NOTE — LETTER
2024    Wilmer Guy DO  [address] [address]    Patient: Flash Vidal   YOB: 1980   Date of Visit: 2024     Encounter Diagnosis     ICD-10-CM    1. Acute right-sided low back pain without sciatica  M54.50           Dear Dr. Guy:    Thank you for your recent referral of Flash Vidal. Please review the attached evaluation summary from Flash's recent visit.     Please verify that you agree with the plan of care by signing the attached order.     If you have any questions or concerns, please do not hesitate to call.     I sincerely appreciate the opportunity to share in the care of one of your patients and hope to have another opportunity to work with you in the near future.       Sincerely,    Kelly Angelucci, PT      Referring Provider:      I certify that I have read the below Plan of Care and certify the need for these services furnished under this plan of treatment while under my care.                    Wilmer Guy DO  [address] [address]  Via Fax: [phone]          PT Evaluation     Today's date: 2024  Patient name: Flash Vidal  : 1980  MRN: [id number]  Referring provider: Wilmer Guy DO  Dx:   Encounter Diagnosis     ICD-10-CM    1. Acute right-sided low back pain without sciatica  M54.50                      Assessment  Assessment details: Flash Vidal is a pleasant 43 y.o. male who presents with acute low back pain.  Patient's greatest concerns are fear of making the problem worse and returning to previous level of activity. Pt presents with acute low back pain after lifting and twisting repetitively. He states the pain was 8/10 when it happened but has improved over the last week. He describes the pain as a dull ache and tightness. Currently his pain is at 2/10. He denies numbness/tingling into the legs and does not experience pain at night. Aggravating factors are DKTC, lumbar  "flexion, sitting, crouching, getting in and out of a car, and worse in the morning. Alleviating factors are ice, heat, meloxicam, walking, and theragun. Pt goals are getting back to [employer], work as a  without pain, and \"make it feel better/make it go away.\"    The primary movement problem is increased tissue tension R QL due to poor lifting mechanics resulting in pain and \"tightness\" and limiting his ability to flex lumbar spine, sit, getting in and out of the car, perform work duties, return to recreational activities. Etiologic factors include repetitive lifting with poor form. No further referral is necessary at this time based upon examination results. Pt would benefit from skilled physical therapy services to address current deficits, improve quality of life, and restore PLOF with transition to home exercise program when appropriate. Positive prognostic indicators include positive attitude towards recovery, low symptom irritability, and high activity level.  Negative prognostic indicators include none.     Primary Impairments:  1) increased tissue tension of R QL  2) p! With lumbar ROM  3) decreased core activation    Function Based Goals:  Patient will be independent with HEP upon discharge.  Patient will be able to independently manage symptoms upon discharge.  Patient will resume prior level of function and home ADLs with minimal to no symptoms upon discharge.  Patient will be able to sit for at least an hour with minimal to no symptoms upon discharge.  Patient will be able to get in and out of a car with minimal to no symptoms upon discharge.  Patient will be able to perform lumbar flexion with minimal to no symptoms upon discharge.  Patient will be able to return to work fully with minimal to no symptoms upon discharge.  Patient will be able to return to [employer] and other recreational activities with minimal to no symptoms upon discharge.    Impairment Based Goals:  Patient will increase " lumbar ROM to full and painfree in 2 weeks.  Patient will demonstrate increased postural tolerance as noted by patient in 2 weeks.  Patient will demonstrate decreased tissue tension of the R QL as noted by therapist and patient in 2 weeks.        Impairments: abnormal coordination, abnormal muscle firing, abnormal muscle tone, abnormal or restricted ROM, abnormal movement, impaired physical strength, lacks appropriate home exercise program, pain with function and poor posture     Symptom irritability: lowUnderstanding of Dx/Px/POC: good   Prognosis: good    Plan  Plan details: Follow-up in 2 weeks to assess response to HEP  Patient would benefit from: skilled physical therapy  Referral necessary: No  Planned modality interventions: cryotherapy, electrical stimulation/Russian stimulation, thermotherapy: hydrocollator packs, TENS, low level laser therapy and traction  Planned therapy interventions: abdominal trunk stabilization, activity modification, balance, body mechanics training, breathing training, coordination, flexibility, functional ROM exercises, home exercise program, therapeutic exercise, therapeutic activities, stretching, strengthening, postural training, patient education, neuromuscular re-education, motor coordination training, massage, manual therapy, joint mobilization, nerve gliding and IASTM  Duration in weeks: 12  Treatment plan discussed with: patient        Subjective Evaluation    History of Present Illness  Date of onset: 3/27/2024  Quality of life: good    Patient Goals  Patient goals for therapy: increased strength, independence with ADLs/IADLs, return to sport/leisure activities, return to work, increased motion, improved balance and decreased pain    Pain  Current pain ratin  At best pain ratin  At worst pain ratin  Location: R sided low back  Quality: tight and dull ache  Relieving factors: medications, ice, heat, relaxation and rest  Aggravating factors: sitting (crouching,  "knee to chest actvities)  Progression: improved    Social Support  Lives with: parents (son (11) not at home)    Employment status: working ()    Diagnostic Tests  No diagnostic tests performed  Treatments  Current treatment: medication        Objective     General Comments:      Lumbar Comments  Lumbar ROM:  Flexion: full, p!  Extension: 25% bone, no p!  SG: full B, p! B     Palpation: TTP R QL and surrounding musculature with increased tissue tension, no p! With P-A testing of lumbar spine    LLD: (-)                 Precautions: N/A  [id number]     POC expires Unit limit Auth Expiration date PT/OT + Visit Limit?   7/2/24 BOMN  BOMN                           Visit/Unit Tracking  AUTH Status:  Date 4/9               Used 1               Remaining                          Manuals 1 - 4/9  FOTO: 72 2 - 3 -  4 -  5 -  FOTO:        STM R QL KA                                                   Neuro Re-Ed             Bridges  10\"x10            Bird dogs 5\"x20            Dead bug nv            Pallof  nv            PBall nv            Squats  nv            Planks  nv            Ther Ex             Child's pose 30\"x3            Hamstring stretch nv            Bike / TM nv                                                                             Ther Activity             Education Lifting mechanics, heat vs. ice            Lifting mechanics             Gait Training                                       Modalities                                                            "

## 2024-04-09 NOTE — PROGRESS NOTES
"PT Evaluation     Today's date: 2024  Patient name: Flash Vidal  : 1980  MRN: [id number]  Referring provider: Wilmer Guy DO  Dx:   Encounter Diagnosis     ICD-10-CM    1. Acute right-sided low back pain without sciatica  M54.50                      Assessment  Assessment details: Flash Vidal is a pleasant 43 y.o. male who presents with acute low back pain.  Patient's greatest concerns are fear of making the problem worse and returning to previous level of activity. Pt presents with acute low back pain after lifting and twisting repetitively. He states the pain was 8/10 when it happened but has improved over the last week. He describes the pain as a dull ache and tightness. Currently his pain is at 2/10. He denies numbness/tingling into the legs and does not experience pain at night. Aggravating factors are DKTC, lumbar flexion, sitting, crouching, getting in and out of a car, and worse in the morning. Alleviating factors are ice, heat, meloxicam, walking, and theragun. Pt goals are getting back to [employer], work as a  without pain, and \"make it feel better/make it go away.\"    The primary movement problem is increased tissue tension R QL due to poor lifting mechanics resulting in pain and \"tightness\" and limiting his ability to flex lumbar spine, sit, getting in and out of the car, perform work duties, return to recreational activities. Etiologic factors include repetitive lifting with poor form. No further referral is necessary at this time based upon examination results. Pt would benefit from skilled physical therapy services to address current deficits, improve quality of life, and restore PLOF with transition to home exercise program when appropriate. Positive prognostic indicators include positive attitude towards recovery, low symptom irritability, and high activity level.  Negative prognostic indicators include none.     Primary Impairments:  1) increased tissue tension of " R QL  2) p! With lumbar ROM  3) decreased core activation    Function Based Goals:  Patient will be independent with HEP upon discharge.  Patient will be able to independently manage symptoms upon discharge.  Patient will resume prior level of function and home ADLs with minimal to no symptoms upon discharge.  Patient will be able to sit for at least an hour with minimal to no symptoms upon discharge.  Patient will be able to get in and out of a car with minimal to no symptoms upon discharge.  Patient will be able to perform lumbar flexion with minimal to no symptoms upon discharge.  Patient will be able to return to work fully with minimal to no symptoms upon discharge.  Patient will be able to return to [employer] and other recreational activities with minimal to no symptoms upon discharge.    Impairment Based Goals:  Patient will increase lumbar ROM to full and painfree in 2 weeks.  Patient will demonstrate increased postural tolerance as noted by patient in 2 weeks.  Patient will demonstrate decreased tissue tension of the R QL as noted by therapist and patient in 2 weeks.        Impairments: abnormal coordination, abnormal muscle firing, abnormal muscle tone, abnormal or restricted ROM, abnormal movement, impaired physical strength, lacks appropriate home exercise program, pain with function and poor posture     Symptom irritability: lowUnderstanding of Dx/Px/POC: good   Prognosis: good    Plan  Plan details: Follow-up in 2 weeks to assess response to HEP  Patient would benefit from: skilled physical therapy  Referral necessary: No  Planned modality interventions: cryotherapy, electrical stimulation/Russian stimulation, thermotherapy: hydrocollator packs, TENS, low level laser therapy and traction  Planned therapy interventions: abdominal trunk stabilization, activity modification, balance, body mechanics training, breathing training, coordination, flexibility, functional ROM exercises, home exercise program,  "therapeutic exercise, therapeutic activities, stretching, strengthening, postural training, patient education, neuromuscular re-education, motor coordination training, massage, manual therapy, joint mobilization, nerve gliding and IASTM  Duration in weeks: 12  Treatment plan discussed with: patient        Subjective Evaluation    History of Present Illness  Date of onset: 3/27/2024  Quality of life: good    Patient Goals  Patient goals for therapy: increased strength, independence with ADLs/IADLs, return to sport/leisure activities, return to work, increased motion, improved balance and decreased pain    Pain  Current pain ratin  At best pain ratin  At worst pain ratin  Location: R sided low back  Quality: tight and dull ache  Relieving factors: medications, ice, heat, relaxation and rest  Aggravating factors: sitting (crouching, knee to chest actvities)  Progression: improved    Social Support  Lives with: parents (son (11) not at home)    Employment status: working ()    Diagnostic Tests  No diagnostic tests performed  Treatments  Current treatment: medication        Objective     General Comments:      Lumbar Comments  Lumbar ROM:  Flexion: full, p!  Extension: 25% bone, no p!  SG: full B, p! B     Palpation: TTP R QL and surrounding musculature with increased tissue tension, no p! With P-A testing of lumbar spine    LLD: (-)                 Precautions: N/A  [id number]     POC expires Unit limit Auth Expiration date PT/OT + Visit Limit?   24 BOMN  BOMN                           Visit/Unit Tracking  AUTH Status:  Date                Used 1               Remaining                          Manuals  -   FOTO: 72 2 - 3 -  4 -  5 -  FOTO:        STM R QL KA                                                   Neuro Re-Ed             Bridges  10\"x10            Bird dogs 5\"x20            Dead bug nv            Pallof  nv            PBall nv            Squats  nv            Planks  nv          " "  Ther Ex             Child's pose 30\"x3            Hamstring stretch nv            Bike / TM nv                                                                             Ther Activity             Education Lifting mechanics, heat vs. ice            Lifting mechanics             Gait Training                                       Modalities                                            "

## 2024-04-22 ENCOUNTER — OFFICE VISIT (OUTPATIENT)
Dept: PHYSICAL THERAPY | Facility: OTHER | Age: 44
End: 2024-04-22
Payer: COMMERCIAL

## 2024-04-22 DIAGNOSIS — M54.50 ACUTE RIGHT-SIDED LOW BACK PAIN WITHOUT SCIATICA: Primary | ICD-10-CM

## 2024-04-22 PROCEDURE — 97112 NEUROMUSCULAR REEDUCATION: CPT

## 2024-04-22 PROCEDURE — 97110 THERAPEUTIC EXERCISES: CPT

## 2024-04-22 PROCEDURE — 97140 MANUAL THERAPY 1/> REGIONS: CPT

## 2024-04-22 NOTE — PROGRESS NOTES
"Daily Note     Today's date: 2024  Patient name: Flash Vidal  : 1980  MRN: [id number]  Referring provider: Wilmer Guy DO  Dx:   Encounter Diagnosis     ICD-10-CM    1. Acute right-sided low back pain without sciatica  M54.50                      Subjective: \"I feel good, it is definitely better. I am trying to get back into jiu jitsu slowly and focusing on not going too fast.\"      Objective: See treatment diary below      Assessment: Session focused on core activation. Pt requested to be discharged at this time to rehab on his own, and return if symptoms do not resolve. HEP given focusing on core and glute activation. All questions answered. Almost all goals met at this time.       Plan: Continue per plan of care.      Precautions: N/A  [id number]     POC expires Unit limit Auth Expiration date PT/OT + Visit Limit?   24 BOMN 24 BOMN                           Visit/Unit Tracking  AUTH Status:  Date 24 - 24 - 12 visits  Used 1 2              Remaining  11 10                       Manuals 1 -   FOTO: 72  -  3 -  4 -  5 -  FOTO:        STM R QL KA KA                                                  Neuro Re-Ed             Bridges  10\"x10 On ball 10\"x15           Bird dogs 5\"x20 5\"x3x5           Dead bug nv 3x5x5\"           Pallof  nv [place] 5 5\"x15    SS 10x            PBall nv To the L 10\"x5           SLS KB pass  Airex 7.5# 15x B            Squats  nv nv           Planks  nv nv           Ther Ex             Child's pose 30\"x3            Hamstring stretch nv HEP           Bike / TM nv 5'                                                                            Ther Activity             Education Lifting mechanics, heat vs. ice            Lifting mechanics             Gait Training                                       Modalities                                            "

## 2024-09-27 ENCOUNTER — APPOINTMENT (OUTPATIENT)
Dept: RADIOLOGY | Age: 44
End: 2024-09-27
Payer: COMMERCIAL

## 2024-09-27 DIAGNOSIS — M79.672 LEFT FOOT PAIN: ICD-10-CM

## 2024-09-27 PROCEDURE — 73630 X-RAY EXAM OF FOOT: CPT

## 2024-09-27 PROCEDURE — 73610 X-RAY EXAM OF ANKLE: CPT
